# Patient Record
Sex: FEMALE | Race: BLACK OR AFRICAN AMERICAN | Employment: OTHER | URBAN - METROPOLITAN AREA
[De-identification: names, ages, dates, MRNs, and addresses within clinical notes are randomized per-mention and may not be internally consistent; named-entity substitution may affect disease eponyms.]

---

## 2018-03-01 PROBLEM — Z80.3 FAMILY HISTORY OF BREAST CANCER IN MOTHER: Status: ACTIVE | Noted: 2018-03-01

## 2018-03-01 PROBLEM — E66.01 MORBID OBESITY (HCC): Status: ACTIVE | Noted: 2018-03-01

## 2018-03-05 PROCEDURE — 82627 DEHYDROEPIANDROSTERONE: CPT | Performed by: INTERNAL MEDICINE

## 2018-03-05 PROCEDURE — 84402 ASSAY OF FREE TESTOSTERONE: CPT | Performed by: INTERNAL MEDICINE

## 2018-03-05 PROCEDURE — 87389 HIV-1 AG W/HIV-1&-2 AB AG IA: CPT | Performed by: INTERNAL MEDICINE

## 2018-03-05 PROCEDURE — 84403 ASSAY OF TOTAL TESTOSTERONE: CPT | Performed by: INTERNAL MEDICINE

## 2018-07-23 ENCOUNTER — HOSPITAL ENCOUNTER (EMERGENCY)
Facility: HOSPITAL | Age: 38
Discharge: HOME | End: 2018-07-23
Attending: EMERGENCY MEDICINE
Payer: COMMERCIAL

## 2018-07-23 VITALS
DIASTOLIC BLOOD PRESSURE: 70 MMHG | TEMPERATURE: 98 F | OXYGEN SATURATION: 98 % | RESPIRATION RATE: 18 BRPM | WEIGHT: 290 LBS | HEIGHT: 67 IN | SYSTOLIC BLOOD PRESSURE: 119 MMHG | HEART RATE: 90 BPM | BODY MASS INDEX: 45.52 KG/M2

## 2018-07-23 DIAGNOSIS — S39.012A BACK STRAIN, INITIAL ENCOUNTER: Primary | ICD-10-CM

## 2018-07-23 PROCEDURE — 99283 EMERGENCY DEPT VISIT LOW MDM: CPT

## 2018-07-23 RX ORDER — IBUPROFEN 600 MG/1
600 TABLET ORAL EVERY 8 HOURS PRN
Qty: 20 TABLET | Refills: 0 | Status: SHIPPED | OUTPATIENT
Start: 2018-07-23 | End: 2018-07-30

## 2018-07-23 RX ORDER — CYCLOBENZAPRINE HCL 10 MG
10 TABLET ORAL 3 TIMES DAILY PRN
Qty: 20 TABLET | Refills: 0 | Status: SHIPPED | OUTPATIENT
Start: 2018-07-23 | End: 2018-07-30

## 2018-07-23 NOTE — ED NOTES
PT safe to DC home per MD. Dominique Rodriguez to dress self. DC teaching done, pt verbalizes understanding. Ambulatory with steady gait to exit.

## 2018-07-23 NOTE — ED PROVIDER NOTES
Patient Seen in: [facility] Emergency Department    History   Patient presents with:  Back Pain (musculoskeletal)    Stated Complaint: Back pain x3 days    HPI    26-year-old female with history of chronic low back pain, here with complaints of ac days  Other systems are as noted in HPI. Constitutional and vital signs reviewed. All other systems reviewed and negative except as noted above.     Physical Exam   ED Triage Vitals [07/23/18 1008]  BP: 119/70  Pulse: 90  Resp: 18  Temp: 98.4 °F (36.9 hour(s)). Imaging Results Available and Reviewed by me while in ED:          EMERGENCY DEPARTMENT COURSE AND TREATMENT:  Patient's condition was stable during Emergency Department evaluation.      37yoF with back pain  - I personally reviewed and interpr Refills: 0    ibuprofen 600 MG Oral Tab  Take 1 tablet (600 mg total) by mouth every 8 (eight) hours as needed.   Qty: 20 tablet Refills: 0

## 2018-09-01 ENCOUNTER — HOSPITAL ENCOUNTER (EMERGENCY)
Facility: HOSPITAL | Age: 38
Discharge: HOME | End: 2018-09-01
Attending: EMERGENCY MEDICINE
Payer: COMMERCIAL

## 2018-09-01 VITALS
OXYGEN SATURATION: 99 % | WEIGHT: 270 LBS | RESPIRATION RATE: 18 BRPM | HEIGHT: 66 IN | HEART RATE: 101 BPM | SYSTOLIC BLOOD PRESSURE: 155 MMHG | BODY MASS INDEX: 43.39 KG/M2 | DIASTOLIC BLOOD PRESSURE: 86 MMHG | TEMPERATURE: 99 F

## 2018-09-01 DIAGNOSIS — S46.812A TRAPEZIUS MUSCLE STRAIN, LEFT, INITIAL ENCOUNTER: Primary | ICD-10-CM

## 2018-09-01 PROCEDURE — 99283 EMERGENCY DEPT VISIT LOW MDM: CPT

## 2018-09-01 RX ORDER — DIAZEPAM 5 MG/1
5 TABLET ORAL EVERY 6 HOURS PRN
Qty: 20 TABLET | Refills: 0 | Status: SHIPPED | OUTPATIENT
Start: 2018-09-01 | End: 2018-09-08

## 2018-09-01 RX ORDER — TRAMADOL HYDROCHLORIDE 50 MG/1
TABLET ORAL EVERY 6 HOURS PRN
Qty: 20 TABLET | Refills: 0 | Status: SHIPPED | OUTPATIENT
Start: 2018-09-01 | End: 2019-01-28 | Stop reason: ALTCHOICE

## 2018-09-01 NOTE — ED INITIAL ASSESSMENT (HPI)
Pt reports to ED with complaints of L neck and L shoulder pain. Pt denies any trauma. Pt had gastric bypass surgery last week at [facility].

## 2018-09-01 NOTE — ED PROVIDER NOTES
Patient Seen in: [facility] AND [facility] Emergency Department    History   Patient presents with:  Neck Pain    Stated Complaint: shoulder and neck pain    HPI    43 yo female with left sided neck pain for several days.  She c/o pain to the left side of the [place] distal pulses. Pulmonary/Chest: Effort normal and breath sounds normal.   Musculoskeletal: She exhibits no edema or deformity. Neurological: She is alert and oriented to person, place, and time. No cranial nerve deficit. Skin: Skin is warm and dry.

## 2018-12-23 ENCOUNTER — HOSPITAL ENCOUNTER (EMERGENCY)
Facility: HOSPITAL | Age: 38
Discharge: HOME | End: 2018-12-23
Attending: EMERGENCY MEDICINE
Payer: COMMERCIAL

## 2018-12-23 ENCOUNTER — APPOINTMENT (OUTPATIENT)
Dept: GENERAL RADIOLOGY | Facility: HOSPITAL | Age: 38
End: 2018-12-23
Attending: EMERGENCY MEDICINE
Payer: COMMERCIAL

## 2018-12-23 ENCOUNTER — APPOINTMENT (OUTPATIENT)
Dept: GENERAL RADIOLOGY | Facility: HOSPITAL | Age: 38
End: 2018-12-23
Payer: COMMERCIAL

## 2018-12-23 VITALS
DIASTOLIC BLOOD PRESSURE: 75 MMHG | OXYGEN SATURATION: 100 % | HEART RATE: 75 BPM | SYSTOLIC BLOOD PRESSURE: 139 MMHG | HEIGHT: 67 IN | BODY MASS INDEX: 36.1 KG/M2 | TEMPERATURE: 98 F | RESPIRATION RATE: 18 BRPM | WEIGHT: 230 LBS

## 2018-12-23 DIAGNOSIS — S92.501A CLOSED FRACTURE OF PHALANX OF RIGHT THIRD TOE, INITIAL ENCOUNTER: Primary | ICD-10-CM

## 2018-12-23 PROCEDURE — 73630 X-RAY EXAM OF FOOT: CPT | Performed by: EMERGENCY MEDICINE

## 2018-12-23 PROCEDURE — 99283 EMERGENCY DEPT VISIT LOW MDM: CPT

## 2018-12-23 NOTE — ED PROVIDER NOTES
Patient Seen in: [facility] AND [facility] Emergency Department    History   Patient presents with:  Lower Extremity Injury (musculoskeletal)    Stated Complaint: stub toe, may be broke    HPI    Patient is a 39-year-old female who presents with right third toe ([id number])    Result Date: 12/23/2018  CONCLUSION:  1. Nondisplaced fracture of the distal shaft of the third proximal phalanx.  2. Mild associated soft tissue swelling at the dorsum of the forefoot     Dictated by (CST): Kelly Wilson MD on 12/23/2018

## 2019-07-15 ENCOUNTER — HOSPITAL ENCOUNTER (EMERGENCY)
Facility: HOSPITAL | Age: 39
Discharge: HOME | End: 2019-07-15
Attending: EMERGENCY MEDICINE
Payer: COMMERCIAL

## 2019-07-15 VITALS
RESPIRATION RATE: 20 BRPM | OXYGEN SATURATION: 97 % | HEART RATE: 113 BPM | TEMPERATURE: 98 F | SYSTOLIC BLOOD PRESSURE: 138 MMHG | DIASTOLIC BLOOD PRESSURE: 67 MMHG

## 2019-07-15 DIAGNOSIS — M54.41 ACUTE BILATERAL LOW BACK PAIN WITH BILATERAL SCIATICA: Primary | ICD-10-CM

## 2019-07-15 DIAGNOSIS — M54.42 ACUTE BILATERAL LOW BACK PAIN WITH BILATERAL SCIATICA: Primary | ICD-10-CM

## 2019-07-15 PROCEDURE — 96372 THER/PROPH/DIAG INJ SC/IM: CPT

## 2019-07-15 PROCEDURE — 99283 EMERGENCY DEPT VISIT LOW MDM: CPT

## 2019-07-15 RX ORDER — DEXAMETHASONE SODIUM PHOSPHATE 10 MG/ML
10 INJECTION, SOLUTION INTRAMUSCULAR; INTRAVENOUS ONCE
Status: COMPLETED | OUTPATIENT
Start: 2019-07-15 | End: 2019-07-15

## 2019-07-15 RX ORDER — CYCLOBENZAPRINE HCL 10 MG
10 TABLET ORAL 3 TIMES DAILY PRN
Qty: 20 TABLET | Refills: 0 | Status: SHIPPED | OUTPATIENT
Start: 2019-07-15 | End: 2019-07-22

## 2019-07-15 RX ORDER — METHYLPREDNISOLONE 4 MG/1
TABLET ORAL
Qty: 1 PACKAGE | Refills: 0 | Status: SHIPPED | OUTPATIENT
Start: 2019-07-15 | End: 2019-07-20

## 2019-07-15 RX ORDER — HYDROCODONE BITARTRATE AND ACETAMINOPHEN 5; 325 MG/1; MG/1
1 TABLET ORAL EVERY 4 HOURS PRN
Qty: 10 TABLET | Refills: 0 | Status: SHIPPED | OUTPATIENT
Start: 2019-07-15 | End: 2019-08-16

## 2019-07-15 RX ORDER — HYDROCODONE BITARTRATE AND ACETAMINOPHEN 5; 325 MG/1; MG/1
1 TABLET ORAL ONCE
Status: COMPLETED | OUTPATIENT
Start: 2019-07-15 | End: 2019-07-15

## 2019-07-15 RX ORDER — CYCLOBENZAPRINE HCL 10 MG
10 TABLET ORAL ONCE
Status: COMPLETED | OUTPATIENT
Start: 2019-07-15 | End: 2019-07-15

## 2019-07-16 NOTE — ED PROVIDER NOTES
Patient Seen in: [facility] AND [facility] Emergency Department    History   Patient presents with:  Back Pain (musculoskeletal)    Stated Complaint: lower back pain x 1 week    HPI    21-year-old female presents for complaint of lower back pain.   Patient has a SpO2 97 %   O2 Device None (Room air)       Current:/67   Pulse 113   Temp 98.3 °F (36.8 °C) (Oral)   Resp 20   SpO2 97%         Physical Exam   Constitutional: She is oriented to person, place, and time.  She appears well-developed and well-nourish All questions were addressed and answered.                         Disposition and Plan     Clinical Impression:  Acute bilateral low back pain with bilateral sciatica  (primary encounter diagnosis)    Disposition:  Discharge  7/15/2019  9:33 pm    Follow-u

## 2019-07-16 NOTE — ED NOTES
Pt c/o low back pain spreading in to bl buttocks, states that it \"suddenly gave out on [her]\" x5days ago. Pt denies loss of bowel/bladder control, numbness/tingling to ble. CMS is intact to ble. Pt denies injury/trauma, and noted hx of back pain.  Will co

## 2019-07-16 NOTE — ED NOTES
Pt provided with discharge instructions and prescriptions. Verbalized understanding for plan of care at home and follow up. All questions/concerns addressed prior to discharge.    Pt ambulatory out of er with steady gait

## 2019-07-30 ENCOUNTER — OFFICE VISIT (OUTPATIENT)
Dept: OBGYN CLINIC | Facility: CLINIC | Age: 39
End: 2019-07-30
Payer: COMMERCIAL

## 2019-07-30 VITALS
BODY MASS INDEX: 32.33 KG/M2 | HEART RATE: 87 BPM | HEIGHT: 67 IN | WEIGHT: 206 LBS | SYSTOLIC BLOOD PRESSURE: 136 MMHG | DIASTOLIC BLOOD PRESSURE: 83 MMHG

## 2019-07-30 DIAGNOSIS — Z11.3 SCREEN FOR STD (SEXUALLY TRANSMITTED DISEASE): ICD-10-CM

## 2019-07-30 DIAGNOSIS — Z97.5 IUD (INTRAUTERINE DEVICE) IN PLACE: ICD-10-CM

## 2019-07-30 DIAGNOSIS — Z01.419 ENCOUNTER FOR GYNECOLOGICAL EXAMINATION WITHOUT ABNORMAL FINDING: Primary | ICD-10-CM

## 2019-07-30 DIAGNOSIS — Z12.4 SCREENING FOR MALIGNANT NEOPLASM OF CERVIX: ICD-10-CM

## 2019-07-30 PROCEDURE — 99385 PREV VISIT NEW AGE 18-39: CPT | Performed by: OBSTETRICS & GYNECOLOGY

## 2019-07-30 NOTE — PROGRESS NOTES
Tor Oswald is a 43year old female  No LMP recorded. (Menstrual status: IUD - Intrauterine Device). Patient presents with:  Gyn Exam: new patient, annual exam  She has no complaints.   She had mirena placed 5 years ago for contraception and she clubs or organizations: Not on file        Relationship status: Not on file      Intimate partner violence:        Fear of current or ex partner: Not on file        Emotionally abused: Not on file        Physically abused: Not on file        Forced sexual denies dysuria, incontinence, abnormal vaginal discharge, vaginal itching  Musculoskeletal:  denies back pain. Skin/Breast:  Denies any breast pain, lumps, or discharge. Neurological:  denies headaches, extremity weakness or numbness.   Psychiatric: fidelina No prescriptions requested or ordered in this encounter       None

## 2019-07-31 LAB
C TRACH DNA SPEC QL NAA+PROBE: NEGATIVE
HPV I/H RISK 1 DNA SPEC QL NAA+PROBE: POSITIVE
N GONORRHOEA DNA SPEC QL NAA+PROBE: NEGATIVE

## 2019-08-04 LAB
HPV16 DNA CVX QL PROBE+SIG AMP: NEGATIVE
HPV18 DNA CVX QL PROBE+SIG AMP: NEGATIVE

## 2019-08-04 NOTE — PROGRESS NOTES
Pap normal, HPV+ but negative for the strains most associated with cervical cancer, needs pap and HPV test in 1 year,call pt

## 2019-08-05 ENCOUNTER — TELEPHONE (OUTPATIENT)
Dept: OBGYN CLINIC | Facility: CLINIC | Age: 39
End: 2019-08-05

## 2019-08-05 NOTE — TELEPHONE ENCOUNTER
----- Message from Maricruz Muñoz MD sent at 8/4/2019  3:31 PM CDT -----  Pap normal, HPV+ but negative for the strains most associated with cervical cancer, needs pap and HPV test in 1 year,call pt

## 2019-08-16 NOTE — ED AVS SNAPSHOT
Delisa Ballard   MRN: [id number]    Department:  [facility] Emergency Department   Date of Visit:  2/4/2020           Disclosure     Insurance plans vary and the physician(s) referred by the ER may not be covered by your plan.  Please contact yo CARE PHYSICIAN AT ONCE OR RETURN IMMEDIATELY TO THE EMERGENCY DEPARTMENT. If you have been prescribed any medication(s), please fill your prescription right away and begin taking the medication(s) as directed.   If you believe that any of the medications Self

## 2019-09-17 ENCOUNTER — OFFICE VISIT (OUTPATIENT)
Dept: OBGYN CLINIC | Facility: CLINIC | Age: 39
End: 2019-09-17
Payer: COMMERCIAL

## 2019-09-17 VITALS
WEIGHT: 213.38 LBS | HEART RATE: 61 BPM | DIASTOLIC BLOOD PRESSURE: 80 MMHG | SYSTOLIC BLOOD PRESSURE: 128 MMHG | BODY MASS INDEX: 33 KG/M2

## 2019-09-17 DIAGNOSIS — Z30.432 ENCOUNTER FOR REMOVAL OF INTRAUTERINE CONTRACEPTIVE DEVICE: Primary | ICD-10-CM

## 2019-09-17 PROCEDURE — 58301 REMOVE INTRAUTERINE DEVICE: CPT | Performed by: OBSTETRICS & GYNECOLOGY

## 2019-10-21 ENCOUNTER — TELEPHONE (OUTPATIENT)
Dept: OBGYN CLINIC | Facility: CLINIC | Age: 39
End: 2019-10-21

## 2019-10-21 NOTE — TELEPHONE ENCOUNTER
Pt confirms +hpt and lmp is approximate on 9/10/19. Reports lmp was the first period after having IUD removed in July. Pt agrees to rotate PN appts with our 2 male and 4 female physicians. Pt accepted OBN on 11/4/19.  Advised pt to start PNV with iron, foli

## 2019-10-23 ENCOUNTER — HOSPITAL ENCOUNTER (EMERGENCY)
Facility: HOSPITAL | Age: 39
Discharge: HOME | End: 2019-10-23
Attending: EMERGENCY MEDICINE
Payer: COMMERCIAL

## 2019-10-23 ENCOUNTER — APPOINTMENT (OUTPATIENT)
Dept: ULTRASOUND IMAGING | Facility: HOSPITAL | Age: 39
End: 2019-10-23
Attending: EMERGENCY MEDICINE
Payer: COMMERCIAL

## 2019-10-23 VITALS
SYSTOLIC BLOOD PRESSURE: 118 MMHG | HEART RATE: 53 BPM | WEIGHT: 213 LBS | TEMPERATURE: 98 F | RESPIRATION RATE: 16 BRPM | DIASTOLIC BLOOD PRESSURE: 81 MMHG | OXYGEN SATURATION: 100 % | BODY MASS INDEX: 33 KG/M2

## 2019-10-23 DIAGNOSIS — O26.891 ABDOMINAL PAIN DURING PREGNANCY IN FIRST TRIMESTER: Primary | ICD-10-CM

## 2019-10-23 DIAGNOSIS — R10.9 ABDOMINAL PAIN DURING PREGNANCY IN FIRST TRIMESTER: Primary | ICD-10-CM

## 2019-10-23 PROCEDURE — 86900 BLOOD TYPING SEROLOGIC ABO: CPT | Performed by: EMERGENCY MEDICINE

## 2019-10-23 PROCEDURE — 87086 URINE CULTURE/COLONY COUNT: CPT

## 2019-10-23 PROCEDURE — 99285 EMERGENCY DEPT VISIT HI MDM: CPT

## 2019-10-23 PROCEDURE — 85025 COMPLETE CBC W/AUTO DIFF WBC: CPT | Performed by: EMERGENCY MEDICINE

## 2019-10-23 PROCEDURE — 86850 RBC ANTIBODY SCREEN: CPT | Performed by: EMERGENCY MEDICINE

## 2019-10-23 PROCEDURE — 81025 URINE PREGNANCY TEST: CPT

## 2019-10-23 PROCEDURE — 81001 URINALYSIS AUTO W/SCOPE: CPT

## 2019-10-23 PROCEDURE — 96374 THER/PROPH/DIAG INJ IV PUSH: CPT

## 2019-10-23 PROCEDURE — 84702 CHORIONIC GONADOTROPIN TEST: CPT | Performed by: EMERGENCY MEDICINE

## 2019-10-23 PROCEDURE — 76801 OB US < 14 WKS SINGLE FETUS: CPT | Performed by: EMERGENCY MEDICINE

## 2019-10-23 PROCEDURE — 87086 URINE CULTURE/COLONY COUNT: CPT | Performed by: EMERGENCY MEDICINE

## 2019-10-23 PROCEDURE — 80048 BASIC METABOLIC PNL TOTAL CA: CPT | Performed by: EMERGENCY MEDICINE

## 2019-10-23 PROCEDURE — 86901 BLOOD TYPING SEROLOGIC RH(D): CPT | Performed by: EMERGENCY MEDICINE

## 2019-10-23 PROCEDURE — 87186 SC STD MICRODIL/AGAR DIL: CPT | Performed by: EMERGENCY MEDICINE

## 2019-10-23 PROCEDURE — 81001 URINALYSIS AUTO W/SCOPE: CPT | Performed by: EMERGENCY MEDICINE

## 2019-10-23 PROCEDURE — 87077 CULTURE AEROBIC IDENTIFY: CPT | Performed by: EMERGENCY MEDICINE

## 2019-10-23 NOTE — ED INITIAL ASSESSMENT (HPI)
Patient took preg test yesterday which came back positive. Last menstrual 9/10. Patient has had cramping today since noon. Also states she was spotting earlier. Not at this time.

## 2019-10-24 ENCOUNTER — OFFICE VISIT (OUTPATIENT)
Dept: OBGYN CLINIC | Facility: CLINIC | Age: 39
End: 2019-10-24
Payer: COMMERCIAL

## 2019-10-24 VITALS
WEIGHT: 216 LBS | BODY MASS INDEX: 34 KG/M2 | HEART RATE: 88 BPM | DIASTOLIC BLOOD PRESSURE: 78 MMHG | SYSTOLIC BLOOD PRESSURE: 119 MMHG

## 2019-10-24 DIAGNOSIS — Z32.01 PREGNANCY EXAMINATION OR TEST, POSITIVE RESULT: ICD-10-CM

## 2019-10-24 DIAGNOSIS — N93.9 VAGINAL SPOTTING: ICD-10-CM

## 2019-10-24 DIAGNOSIS — O20.0 THREATENED ABORTION, ANTEPARTUM: ICD-10-CM

## 2019-10-24 DIAGNOSIS — R10.2 PELVIC CRAMPING: Primary | ICD-10-CM

## 2019-10-24 PROCEDURE — 99213 OFFICE O/P EST LOW 20 MIN: CPT | Performed by: OBSTETRICS & GYNECOLOGY

## 2019-10-24 RX ORDER — FLUOCINOLONE ACETONIDE 0.11 MG/ML
OIL TOPICAL
Refills: 1 | COMMUNITY
Start: 2019-09-18

## 2019-10-24 NOTE — PROGRESS NOTES
Karen Busby Angela    1980       Patient presents with: Follow - Up: er f/u cramps  pt was seen in [facility] ER yesterday with a positive pregnancy test and vaginal spotting and cramping.   bhcg at the time ws only 40 and no gestational sac seen on the US Oral Tab EC, Take 325 mg by mouth daily with breakfast., Disp: , Rfl:   Acetaminophen-Codeine #3 300-30 MG Oral Tab, Take 1 tablet by mouth every 6 (six) hours as needed for Pain., Disp: 15 tablet, Rfl: 0    No current facility-administered medications on

## 2019-10-24 NOTE — ED PROVIDER NOTES
Patient Seen in: [facility] Emergency Department      History   Patient presents with:  Pregnancy Issues (gynecologic)    Stated Complaint: pos preg test yesterday, cramping today, no bleeding    HPI    13-year-old female G3, P 2 at 4 weeks gestat systems reviewed and are negative. Positive for stated complaint: pos preg test yesterday, cramping today, no bleeding  Other systems are as noted in HPI. Constitutional and vital signs reviewed.       All other systems reviewed and negative except as oxygenation.     PROCEDURES:  none    DIAGNOSTICS:   Labs:  Recent Results (from the past 24 hour(s))   URINALYSIS WITH CULTURE REFLEX    Collection Time: 10/23/19  6:00 PM   Result Value Ref Range    Urine Color Yellow Yellow    Clarity Urine Clear Clear 3.80 - 5.30 x10(6)uL    HGB 12.3 12.0 - 16.0 g/dL    HCT 37.5 35.0 - 48.0 %    MCV 98.2 80.0 - 100.0 fL    MCH 32.2 26.0 - 34.0 pg    MCHC 32.8 31.0 - 37.0 g/dL    RDW-SD 44.8 35.1 - 46.3 fL    RDW 12.5 11.0 - 15.0 %    .0 150.0 - 450.0 10(3)uL    N on 10/23/2019 at 21:38              EMERGENCY DEPARTMENT COURSE AND TREATMENT:  Patient's condition was stable during Emergency Department evaluation.      38yoF with vaginal bleeding and abdominal pain in pregnancy test   - I personally reviewed and interp diagnosis)    Disposition:  Discharge  10/23/2019 11:12 pm    Follow-up:  Stuart Miramontes MD  [address] [place] [address]. [facility]  [phone]    Schedule an appointment as soon as possible for a visit in 2 days  for repeat HCG/possible Ult

## 2019-10-26 ENCOUNTER — APPOINTMENT (OUTPATIENT)
Dept: LAB | Facility: HOSPITAL | Age: 39
End: 2019-10-26
Attending: OBSTETRICS & GYNECOLOGY
Payer: COMMERCIAL

## 2019-10-26 ENCOUNTER — TELEPHONE (OUTPATIENT)
Dept: OBGYN CLINIC | Facility: CLINIC | Age: 39
End: 2019-10-26

## 2019-10-26 DIAGNOSIS — O20.0 THREATENED ABORTION, ANTEPARTUM: ICD-10-CM

## 2019-10-26 DIAGNOSIS — Z34.81 ENCOUNTER FOR SUPERVISION OF OTHER NORMAL PREGNANCY IN FIRST TRIMESTER: Primary | ICD-10-CM

## 2019-10-26 PROCEDURE — 36415 COLL VENOUS BLD VENIPUNCTURE: CPT

## 2019-10-26 PROCEDURE — 84702 CHORIONIC GONADOTROPIN TEST: CPT

## 2019-10-26 NOTE — TELEPHONE ENCOUNTER
CAP reviewed her quant  from 10-26-19 and level was 109. Informed pt that wants her to do an ob ultrasound in one week. Informed pt to schedule an OBN.  Pt stated understanding.      (10-23-19 hcg was 40 and 10-26-19 hcg as 109)

## 2019-11-01 ENCOUNTER — TELEPHONE (OUTPATIENT)
Dept: OBGYN CLINIC | Facility: CLINIC | Age: 39
End: 2019-11-01

## 2019-11-01 DIAGNOSIS — O20.0 THREATENED ABORTION, ANTEPARTUM: Primary | ICD-10-CM

## 2019-11-01 NOTE — TELEPHONE ENCOUNTER
Paged on call with c/o spotty bleeding when wiping earlier today but now like a light menstrual flow. No pain. She is Rh negative and had Rhogam. I reviewed previous HCG levels and also ED US result.  I asked Pattilesly Landrymarilynn to come for repeat HCG in early AM tomorro

## 2019-11-02 ENCOUNTER — APPOINTMENT (OUTPATIENT)
Dept: LAB | Facility: HOSPITAL | Age: 39
End: 2019-11-02
Attending: OBSTETRICS & GYNECOLOGY
Payer: MEDICAID

## 2019-11-02 ENCOUNTER — TELEPHONE (OUTPATIENT)
Dept: OBGYN CLINIC | Facility: CLINIC | Age: 39
End: 2019-11-02

## 2019-11-02 DIAGNOSIS — O20.0 THREATENED ABORTION, ANTEPARTUM: Primary | ICD-10-CM

## 2019-11-02 DIAGNOSIS — O20.0 THREATENED ABORTION, ANTEPARTUM: ICD-10-CM

## 2019-11-02 PROCEDURE — 36415 COLL VENOUS BLD VENIPUNCTURE: CPT

## 2019-11-02 PROCEDURE — 84702 CHORIONIC GONADOTROPIN TEST: CPT

## 2019-11-02 NOTE — TELEPHONE ENCOUNTER
I spoke with patient to discuss this AM's HCG level which is 407. This does not show significant rise from 10-26 but we just don't know how high it was prior to the significant bleed yesterday. It should have risen to 900-1000.  She has no pain so I suggest

## 2019-11-04 ENCOUNTER — TELEPHONE (OUTPATIENT)
Dept: OBGYN CLINIC | Facility: CLINIC | Age: 39
End: 2019-11-04

## 2019-11-04 ENCOUNTER — APPOINTMENT (OUTPATIENT)
Dept: LAB | Facility: HOSPITAL | Age: 39
End: 2019-11-04
Attending: OBSTETRICS & GYNECOLOGY
Payer: MEDICAID

## 2019-11-04 DIAGNOSIS — O20.0 THREATENED ABORTION, ANTEPARTUM: ICD-10-CM

## 2019-11-04 PROCEDURE — 36415 COLL VENOUS BLD VENIPUNCTURE: CPT

## 2019-11-04 PROCEDURE — 84702 CHORIONIC GONADOTROPIN TEST: CPT

## 2019-11-04 NOTE — TELEPHONE ENCOUNTER
Pt states she had an emergency with her youngest daughter and will try and go by 8pm tonight to have labs done. Pt reports spotting bright red \"only with wiping\". Pt denies pain or cramping. Advised pt to go to lab asap to have quant done. Lab hours provided to pt. Urged pt to go earlier then 8pm. Pt verbalized understanding.

## 2019-11-04 NOTE — TELEPHONE ENCOUNTER
Pt was signed out to me over the weekend by BARRINGTON.  Was supposed to go for quant HCG today. Please call Dr. Tran Quiroga with results. Please call patient and get update on status. Thanks!

## 2019-11-04 NOTE — TELEPHONE ENCOUNTER
See communication from 11-22-19, BARRINGTON's notes. Look for hcg and discuss next step with MD on Call.

## 2019-11-05 ENCOUNTER — OFFICE VISIT (OUTPATIENT)
Dept: HEMATOLOGY/ONCOLOGY | Facility: HOSPITAL | Age: 39
End: 2019-11-05
Attending: OBSTETRICS & GYNECOLOGY
Payer: MEDICAID

## 2019-11-05 ENCOUNTER — LAB ENCOUNTER (OUTPATIENT)
Dept: LAB | Facility: HOSPITAL | Age: 39
End: 2019-11-05
Attending: OBSTETRICS & GYNECOLOGY
Payer: MEDICAID

## 2019-11-05 VITALS
RESPIRATION RATE: 16 BRPM | BODY MASS INDEX: 33.43 KG/M2 | HEART RATE: 68 BPM | WEIGHT: 213 LBS | SYSTOLIC BLOOD PRESSURE: 125 MMHG | DIASTOLIC BLOOD PRESSURE: 62 MMHG | HEIGHT: 67 IN | TEMPERATURE: 98 F

## 2019-11-05 DIAGNOSIS — O00.80 OTHER ECTOPIC PREGNANCY WITHOUT INTRAUTERINE PREGNANCY: Primary | ICD-10-CM

## 2019-11-05 DIAGNOSIS — O00.90 ECTOPIC PREGNANCY: ICD-10-CM

## 2019-11-05 DIAGNOSIS — O00.80 OTHER ECTOPIC PREGNANCY WITHOUT INTRAUTERINE PREGNANCY: ICD-10-CM

## 2019-11-05 DIAGNOSIS — O00.90 ECTOPIC PREGNANCY: Primary | ICD-10-CM

## 2019-11-05 PROCEDURE — 80053 COMPREHEN METABOLIC PANEL: CPT

## 2019-11-05 PROCEDURE — 96401 CHEMO ANTI-NEOPL SQ/IM: CPT

## 2019-11-05 PROCEDURE — 36415 COLL VENOUS BLD VENIPUNCTURE: CPT

## 2019-11-05 PROCEDURE — 85025 COMPLETE CBC W/AUTO DIFF WBC: CPT

## 2019-11-05 PROCEDURE — 84702 CHORIONIC GONADOTROPIN TEST: CPT

## 2019-11-05 NOTE — TELEPHONE ENCOUNTER
[facility] CONFIRMED THEY CAN SEE THE MTX ORDER AND THAT IT WAS DONE CORRECTLY. THEY WILL CALL THE PT TO GET HER IN ALONG WITH GETTING NECESSARY LABS DONE ALSO. I LEFT MESSAGE FOR PT THAT [facility] WILL BE CALLING HER BUT TO NOTIFY US IF SHE DOES NOT HEAR FROM THEM BY 4PM TODAY.

## 2019-11-05 NOTE — TELEPHONE ENCOUNTER
PT IS CALLING SAID SHE SPOKE TO DR DUARTE LAST NIGHT AND WAS TOLD A NURSE WOULD BE CALLING HER INT HE MORNING ,

## 2019-11-05 NOTE — TELEPHONE ENCOUNTER
Spoke with patient regarding her abnormally rising bhcg's and discussed that this  pregnancy is not normal and is either an impending SAB vs early ectopic. Due to the risks of possible ectopic I recommended methotrexate and discussed the risks and benefits of this treatment with pt. She denies abdominal pain or vaginal bleeding. She is willing to come in am for methotrexate at the infusion center. Please help her arrange this for am. She does not have to work tomorrow. Dose will be 50mg per meters squared- please have pharmacy calculate dose based on her current weight.  Thank you

## 2019-11-05 NOTE — TELEPHONE ENCOUNTER
11-4-19 QUANT = 542.0, 11-2-19 QUANT = 407.0 AND 10-26-19 QUANT = 109.0. PLEASE SEE 11-2-19 PHONE ENCOUNTER FROM BARRINGTON.   MESSAGE TO CAP (ON CALL). PT IS AWARE OF THE QUANT FROM TODAY. DENIES ANY PELVIC PAIN. WE DISCUSSED MTX AND ALSO THE NEED TO GO STRAIGHT TO THE ER IF SHE DEVELOPS ANY PELVIC PAIN. PT AWARE WE WILL CALL THIS EVENING OR FIRST THING IN THE MORNING WITH RECS FROM CAP.

## 2019-11-05 NOTE — PROGRESS NOTES
Pt arrived for methotrexate injection for Ectopic Pregnancy/Spontaneous . Pt received education from ordering provider regarding diagnosis, drug, labs, and follow up care.   Pt received Rhogam on 10/23/19 in MD office, confirmed with KIRAN Kaufman

## 2019-11-05 NOTE — PATIENT INSTRUCTIONS
- Go for blood work on Saturday 11/9/19 and Tuesday 11/12/19     - Drink plenty of fluids for the next few days  - Follow up with your obstetrician as ordered    Call your doctor immediately if you have any of the following:  - Fever greater than 100.4 deg

## 2019-11-08 ENCOUNTER — APPOINTMENT (OUTPATIENT)
Dept: LAB | Facility: HOSPITAL | Age: 39
End: 2019-11-08
Attending: OBSTETRICS & GYNECOLOGY
Payer: MEDICAID

## 2019-11-08 DIAGNOSIS — O00.80 OTHER ECTOPIC PREGNANCY WITHOUT INTRAUTERINE PREGNANCY: ICD-10-CM

## 2019-11-08 PROCEDURE — 84702 CHORIONIC GONADOTROPIN TEST: CPT

## 2019-11-08 PROCEDURE — 36415 COLL VENOUS BLD VENIPUNCTURE: CPT

## 2019-11-09 ENCOUNTER — TELEPHONE (OUTPATIENT)
Dept: PEDIATRICS CLINIC | Facility: CLINIC | Age: 39
End: 2019-11-09

## 2019-11-09 NOTE — TELEPHONE ENCOUNTER
PT NOTIFIED 11-8-19 QUANT = 669.0.  REASSURED HER THE BLOOD DRAW ON MON (DAY 7) WILL LET US KNOW IF SHE NEEDS ANOTHER SHOT OF MTX.   WILL FORWARD TO BU[place]Y (FYI)

## 2019-11-12 NOTE — TELEPHONE ENCOUNTER
Notified pt she is due for Raquel Trinidad today and lab is open until 8 pm. Pt states she cannot make it today. Advised on the importance of completing lab today. States she will have the lab done tomorrow morning. Informed the lab opens at 6:30 am. Nurse please monitor for result tomorrow morning. To BRITTANY on call as fyi.

## 2019-11-12 NOTE — TELEPHONE ENCOUNTER
Since the patient received MTX she needs to follow protocol regarding timing.   If she was advised to come today, she needs to come today

## 2019-11-12 NOTE — TELEPHONE ENCOUNTER
Notified pt of message below. Again advised on importance of monitoring Quant in case pt needs another dose of medication to prevent a potential medical emergency. Pt states she cannot come in today. States she will be the first person at 6:30 am tomorrow morning. Nurse please monitor for result.

## 2019-11-12 NOTE — TELEPHONE ENCOUNTER
Pt states that she went to get the Hcg done today and pt stating that registration would not draw it, because she does not have insurance at the present and would not pay. Pt states that she was laid off and does not have insurance at the present. She is applying for [employer], but does not have it yet. Informed pt that I discussed with our manager and pt must have the hcg done today. Informed pt to pay what she can for the lab test. Informed pt to have registration if they decline drawning the blood. Pt stated understanding and went to have the hcg drawn today.

## 2019-11-13 ENCOUNTER — TELEPHONE (OUTPATIENT)
Dept: OBGYN CLINIC | Facility: CLINIC | Age: 39
End: 2019-11-13

## 2019-11-13 ENCOUNTER — LABORATORY ENCOUNTER (OUTPATIENT)
Dept: LAB | Facility: HOSPITAL | Age: 39
End: 2019-11-13
Attending: OBSTETRICS & GYNECOLOGY
Payer: MEDICAID

## 2019-11-13 DIAGNOSIS — O00.90 ECTOPIC PREGNANCY WITHOUT INTRAUTERINE PREGNANCY, UNSPECIFIED LOCATION: ICD-10-CM

## 2019-11-13 DIAGNOSIS — O00.90 ECTOPIC PREGNANCY WITHOUT INTRAUTERINE PREGNANCY, UNSPECIFIED LOCATION: Primary | ICD-10-CM

## 2019-11-13 LAB — B-HCG SERPL-ACNC: 473 MIU/ML

## 2019-11-13 PROCEDURE — 85025 COMPLETE CBC W/AUTO DIFF WBC: CPT

## 2019-11-13 PROCEDURE — 80053 COMPREHEN METABOLIC PANEL: CPT

## 2019-11-13 PROCEDURE — 84702 CHORIONIC GONADOTROPIN TEST: CPT | Performed by: OBSTETRICS & GYNECOLOGY

## 2019-11-13 PROCEDURE — 36415 COLL VENOUS BLD VENIPUNCTURE: CPT

## 2019-11-13 NOTE — TELEPHONE ENCOUNTER
IGOR  [address], REGISTRATION SUPERVISOR, BRIEFLY EXPLAINED SITUATION AND ASKED FOR SOME DIRECTION TODAY TO GET THIS PT IN FOR BLOOD DRAW.

## 2019-11-13 NOTE — TELEPHONE ENCOUNTER
STATES SHE OFFERED TO PAY $5 YESTERDAY BUT REGISTRATION WOULD NOT ACCEPT HER PAYMENT, WAS TOLD SHE HAD TO PAY MORE. PT CONFIRMED WITH [employer] THAT EVERYTHING WILL BE COVERED FROM THE TIME HER REGULAR INSURANCE  BUT THE PAPERWORK HAS TO BE PROCESSED, SHOULD BE ALL DONE BY 11-15 OR . PT STATES SHE IS NOT HAVING PAIN AND NO BLEEDING/SPOTTING SINCE FRI.

## 2019-11-13 NOTE — TELEPHONE ENCOUNTER
SPOKE WITH MYRTLE AND REVIEWED WHAT HAPPENED WITH PT TRYING TO GET LABS DRAWN. SHE WILL BE SURE THIS IS ADDRESSED AND ASSURED ME THE PT CAN COME FOR LABS NOW/THIS EVENING AND WILL NOT BE REQUIRED TO PAY OUT OF POCKET. PT NOTIFIED AND WILL COME FOR LABS LIKELY AROUND 5PM AFTER DROPPING HER DAUGHTER OFF AT WORK.

## 2019-11-13 NOTE — TELEPHONE ENCOUNTER
SEE 11-5-19 RESULT NOTE FOR QUANT. [place] REQUESTED ALL LABS BE ORDERED UNDER CAP'S NAME. CBC, CMP AND QUANT FOR DAY 7 DRAW CANCELLED AND REORDERED IN CAP'S NAME.

## 2019-11-13 NOTE — TELEPHONE ENCOUNTER
Pt waiting on [employer] to kick in, per Dr Farrar pt went to Labs to do bloodwork and they would not see her.  Pt said discussed matter with her and she said tell Labs to call upstairs and they would not

## 2019-11-14 NOTE — TELEPHONE ENCOUNTER
Please see below why lab was not completed on time. Message below reviewed with CAP. Per CAP pt needs to have quant drawn 48 hrs from last draw. Methotrexate was given on 11/5. Quant 11/13= 473  Quant 11/8= 669   Quant 11/5= 595    Informed pt of needing to have quant drawn again from 48 hours from last draw. Pt denies bleeding, spotting, or pelvic pain. Strict ectopic precautions given to pt. Quant ordered. Pt verbalized understanding.

## 2019-11-15 ENCOUNTER — APPOINTMENT (OUTPATIENT)
Dept: LAB | Facility: HOSPITAL | Age: 39
End: 2019-11-15
Attending: OBSTETRICS & GYNECOLOGY
Payer: MEDICAID

## 2019-11-15 DIAGNOSIS — O00.90 ECTOPIC PREGNANCY WITHOUT INTRAUTERINE PREGNANCY, UNSPECIFIED LOCATION: ICD-10-CM

## 2019-11-15 PROCEDURE — 84702 CHORIONIC GONADOTROPIN TEST: CPT

## 2019-11-15 PROCEDURE — 36415 COLL VENOUS BLD VENIPUNCTURE: CPT

## 2019-11-16 NOTE — TELEPHONE ENCOUNTER
11/15 quant= 358. [address] [facility] [address] [facility] [address] on call reviewed Quants. V/O received for pt to repeat Quant in 2-3 days to ensure it continues to drop. Pt notified and agrees to repeat Quant on Monday 11/18.

## 2019-11-18 ENCOUNTER — TELEPHONE (OUTPATIENT)
Dept: OBGYN CLINIC | Facility: CLINIC | Age: 39
End: 2019-11-18

## 2019-11-18 DIAGNOSIS — O00.91 ECTOPIC PREGNANCY WITH INTRAUTERINE PREGNANCY, UNSPECIFIED LOCATION: Primary | ICD-10-CM

## 2019-11-18 NOTE — TELEPHONE ENCOUNTER
Please inform pt that her serum quants are decreasing.   She needs to repeat the quant weekly until zero- please order and inform pt, thanks

## 2019-11-25 ENCOUNTER — TELEPHONE (OUTPATIENT)
Dept: OBGYN CLINIC | Facility: CLINIC | Age: 39
End: 2019-11-25

## 2019-11-26 ENCOUNTER — TELEPHONE (OUTPATIENT)
Dept: OBGYN UNIT | Facility: HOSPITAL | Age: 39
End: 2019-11-26

## 2019-11-27 NOTE — TELEPHONE ENCOUNTER
Informed pt the need to return weekly for quant level. Encouraged pt to go asap as last quant was on 11/15/19. Pt states she will go later today for quant. Pt verbalized understanding.

## 2019-12-05 ENCOUNTER — TELEPHONE (OUTPATIENT)
Dept: OBGYN CLINIC | Facility: CLINIC | Age: 39
End: 2019-12-05

## 2019-12-05 NOTE — TELEPHONE ENCOUNTER
Pt has still not come in for her quant, please advise her that it is very important that we follow these levels until they are zero to make sure that she does not develop gestational trophoblastic disease which is when the products of conception can become invasive and try to invade the uterine muscle and spread to other organs. This complication is rare but because it can be very serious, this is the reason we follow the hormone level until it is negative. Please ask her to come in asap.

## 2020-02-04 ENCOUNTER — APPOINTMENT (OUTPATIENT)
Dept: ULTRASOUND IMAGING | Facility: HOSPITAL | Age: 40
End: 2020-02-04
Attending: PHYSICIAN ASSISTANT
Payer: MEDICAID

## 2020-02-04 ENCOUNTER — HOSPITAL ENCOUNTER (EMERGENCY)
Facility: HOSPITAL | Age: 40
Discharge: HOME | End: 2020-02-04
Attending: PHYSICIAN ASSISTANT
Payer: MEDICAID

## 2020-02-04 VITALS
HEART RATE: 69 BPM | SYSTOLIC BLOOD PRESSURE: 120 MMHG | RESPIRATION RATE: 20 BRPM | OXYGEN SATURATION: 100 % | DIASTOLIC BLOOD PRESSURE: 73 MMHG

## 2020-02-04 DIAGNOSIS — O20.0 THREATENED MISCARRIAGE: Primary | ICD-10-CM

## 2020-02-04 DIAGNOSIS — O23.41 URINARY TRACT INFECTION IN MOTHER DURING FIRST TRIMESTER OF PREGNANCY: ICD-10-CM

## 2020-02-04 LAB
ANION GAP SERPL CALC-SCNC: 4 MMOL/L (ref 0–18)
ANTIBODY SCREEN: NEGATIVE
B-HCG SERPL-ACNC: 37 MIU/ML
B-HCG UR QL: POSITIVE
B-HCG UR QL: POSITIVE
BASOPHILS # BLD AUTO: 0.03 X10(3) UL (ref 0–0.2)
BASOPHILS NFR BLD AUTO: 0.3 %
BILIRUB UR QL: NEGATIVE
BUN BLD-MCNC: 9 MG/DL (ref 7–18)
BUN/CREAT SERPL: 12.5 (ref 10–20)
CALCIUM BLD-MCNC: 8.4 MG/DL (ref 8.5–10.1)
CHLORIDE SERPL-SCNC: 111 MMOL/L (ref 98–112)
CLARITY UR: CLEAR
CO2 SERPL-SCNC: 24 MMOL/L (ref 21–32)
COLOR UR: YELLOW
CREAT BLD-MCNC: 0.72 MG/DL (ref 0.55–1.02)
DEPRECATED RDW RBC AUTO: 43 FL (ref 35.1–46.3)
EOSINOPHIL # BLD AUTO: 0.1 X10(3) UL (ref 0–0.7)
EOSINOPHIL NFR BLD AUTO: 1 %
ERYTHROCYTE [DISTWIDTH] IN BLOOD BY AUTOMATED COUNT: 12.1 % (ref 11–15)
GLUCOSE BLD-MCNC: 71 MG/DL (ref 70–99)
GLUCOSE UR-MCNC: NEGATIVE MG/DL
HCT VFR BLD AUTO: 38.6 % (ref 35–48)
HGB BLD-MCNC: 12.4 G/DL (ref 12–16)
HGB UR QL STRIP.AUTO: NEGATIVE
IMM GRANULOCYTES # BLD AUTO: 0.03 X10(3) UL (ref 0–1)
IMM GRANULOCYTES NFR BLD: 0.3 %
KETONES UR-MCNC: NEGATIVE MG/DL
LYMPHOCYTES # BLD AUTO: 2.94 X10(3) UL (ref 1–4)
LYMPHOCYTES NFR BLD AUTO: 29.8 %
MCH RBC QN AUTO: 31.2 PG (ref 26–34)
MCHC RBC AUTO-ENTMCNC: 32.1 G/DL (ref 31–37)
MCV RBC AUTO: 97 FL (ref 80–100)
MONOCYTES # BLD AUTO: 0.59 X10(3) UL (ref 0.1–1)
MONOCYTES NFR BLD AUTO: 6 %
NEUTROPHILS # BLD AUTO: 6.17 X10 (3) UL (ref 1.5–7.7)
NEUTROPHILS # BLD AUTO: 6.17 X10(3) UL (ref 1.5–7.7)
NEUTROPHILS NFR BLD AUTO: 62.6 %
NITRITE UR QL STRIP.AUTO: NEGATIVE
OSMOLALITY SERPL CALC.SUM OF ELEC: 285 MOSM/KG (ref 275–295)
PH UR: 5 [PH] (ref 5–8)
PLATELET # BLD AUTO: 245 10(3)UL (ref 150–450)
POTASSIUM SERPL-SCNC: 3.5 MMOL/L (ref 3.5–5.1)
PROT UR-MCNC: NEGATIVE MG/DL
RBC # BLD AUTO: 3.98 X10(6)UL (ref 3.8–5.3)
RBC #/AREA URNS AUTO: 3 /HPF
RH BLOOD TYPE: NEGATIVE
SODIUM SERPL-SCNC: 139 MMOL/L (ref 136–145)
SP GR UR STRIP: 1.01 (ref 1–1.03)
UROBILINOGEN UR STRIP-ACNC: <2
WBC # BLD AUTO: 9.9 X10(3) UL (ref 4–11)
WBC #/AREA URNS AUTO: 6 /HPF

## 2020-02-04 PROCEDURE — 84702 CHORIONIC GONADOTROPIN TEST: CPT | Performed by: PHYSICIAN ASSISTANT

## 2020-02-04 PROCEDURE — 96361 HYDRATE IV INFUSION ADD-ON: CPT

## 2020-02-04 PROCEDURE — 86901 BLOOD TYPING SEROLOGIC RH(D): CPT | Performed by: PHYSICIAN ASSISTANT

## 2020-02-04 PROCEDURE — 85025 COMPLETE CBC W/AUTO DIFF WBC: CPT | Performed by: PHYSICIAN ASSISTANT

## 2020-02-04 PROCEDURE — 76817 TRANSVAGINAL US OBSTETRIC: CPT | Performed by: PHYSICIAN ASSISTANT

## 2020-02-04 PROCEDURE — 81025 URINE PREGNANCY TEST: CPT

## 2020-02-04 PROCEDURE — 87077 CULTURE AEROBIC IDENTIFY: CPT | Performed by: PHYSICIAN ASSISTANT

## 2020-02-04 PROCEDURE — 87086 URINE CULTURE/COLONY COUNT: CPT | Performed by: PHYSICIAN ASSISTANT

## 2020-02-04 PROCEDURE — 80048 BASIC METABOLIC PNL TOTAL CA: CPT | Performed by: PHYSICIAN ASSISTANT

## 2020-02-04 PROCEDURE — 81001 URINALYSIS AUTO W/SCOPE: CPT | Performed by: PHYSICIAN ASSISTANT

## 2020-02-04 PROCEDURE — 96374 THER/PROPH/DIAG INJ IV PUSH: CPT

## 2020-02-04 PROCEDURE — 99284 EMERGENCY DEPT VISIT MOD MDM: CPT

## 2020-02-04 PROCEDURE — 86850 RBC ANTIBODY SCREEN: CPT | Performed by: PHYSICIAN ASSISTANT

## 2020-02-04 PROCEDURE — 76801 OB US < 14 WKS SINGLE FETUS: CPT | Performed by: PHYSICIAN ASSISTANT

## 2020-02-04 PROCEDURE — 86900 BLOOD TYPING SEROLOGIC ABO: CPT | Performed by: PHYSICIAN ASSISTANT

## 2020-02-04 PROCEDURE — 87186 SC STD MICRODIL/AGAR DIL: CPT | Performed by: PHYSICIAN ASSISTANT

## 2020-02-04 RX ORDER — CEPHALEXIN 500 MG/1
500 CAPSULE ORAL 3 TIMES DAILY
Qty: 21 CAPSULE | Refills: 0 | Status: SHIPPED | OUTPATIENT
Start: 2020-02-04 | End: 2020-02-11

## 2020-02-04 NOTE — ED NOTES
Pt states she took a pregnancy test last week and it was positive- states last night began to have mild bright red spotting. Pt denies pain. Pt states this past Oct had an ectopic pregnancy and needed injections to remove.

## 2020-02-04 NOTE — ED PROVIDER NOTES
Patient Seen in: [facility] AND [facility] Emergency Department    History   Patient presents with:  Pregnancy Issues    Stated Complaint: positive preg test, bleeding    HPI    Patient is an unknown weeks pregnant 79-year-old female that presents with a chief c Oral Tab,  Take 500 mcg by mouth daily. Cholecalciferol (VITAMIN D) 1000 units Oral Tab,  Take by mouth.    ferrous sulfate 325 (65 FE) MG Oral Tab EC,  Take 325 mg by mouth daily with breakfast.       Family History   Problem Relation Age of Onset   • Hy guarding. No organomegaly is noted. No peritoneal signs. Normal bowel sounds. No McBurney point tenderness. Negative Nunez sign. Genitourinary: Not examined. Lymphatic: No gross lymphadenopathy noted.   Musculoskeletal: Musculoskeletal system is will tests on the individual orders. TYPE AND SCREEN    Narrative: The following orders were created for panel order TYPE AND SCREEN.   Procedure                               Abnormality         Status                     --------- MD  [address]  [phone]    Schedule an appointment as soon as possible for a visit in 2 days  For follow-up    We recommend that you schedule follow up care with a primary care provider within the next three concepcion

## 2020-02-04 NOTE — ED INITIAL ASSESSMENT (HPI)
Patient took a home pregnancy test last week that came back positive. States she began spotting yesterday without pain.

## 2020-02-10 ENCOUNTER — APPOINTMENT (OUTPATIENT)
Dept: LAB | Facility: HOSPITAL | Age: 40
End: 2020-02-10
Attending: OBSTETRICS & GYNECOLOGY
Payer: COMMERCIAL

## 2020-02-10 ENCOUNTER — TELEPHONE (OUTPATIENT)
Dept: OBGYN CLINIC | Facility: CLINIC | Age: 40
End: 2020-02-10

## 2020-02-10 ENCOUNTER — OFFICE VISIT (OUTPATIENT)
Dept: OBGYN CLINIC | Facility: CLINIC | Age: 40
End: 2020-02-10
Payer: COMMERCIAL

## 2020-02-10 VITALS
HEART RATE: 91 BPM | DIASTOLIC BLOOD PRESSURE: 73 MMHG | WEIGHT: 224 LBS | BODY MASS INDEX: 35 KG/M2 | SYSTOLIC BLOOD PRESSURE: 117 MMHG

## 2020-02-10 DIAGNOSIS — O26.899 CRAMPING AFFECTING PREGNANCY, ANTEPARTUM: ICD-10-CM

## 2020-02-10 DIAGNOSIS — R10.9 CRAMPING AFFECTING PREGNANCY, ANTEPARTUM: ICD-10-CM

## 2020-02-10 DIAGNOSIS — N93.9 VAGINA BLEEDING: ICD-10-CM

## 2020-02-10 DIAGNOSIS — Z87.59 HISTORY OF ECTOPIC PREGNANCY: Primary | ICD-10-CM

## 2020-02-10 DIAGNOSIS — Z87.59 HISTORY OF ECTOPIC PREGNANCY: ICD-10-CM

## 2020-02-10 DIAGNOSIS — N93.9 VAGINAL BLEEDING: Primary | ICD-10-CM

## 2020-02-10 LAB — B-HCG SERPL-ACNC: 112 MIU/ML

## 2020-02-10 PROCEDURE — 99214 OFFICE O/P EST MOD 30 MIN: CPT | Performed by: OBSTETRICS & GYNECOLOGY

## 2020-02-10 PROCEDURE — 36415 COLL VENOUS BLD VENIPUNCTURE: CPT

## 2020-02-10 PROCEDURE — 84702 CHORIONIC GONADOTROPIN TEST: CPT

## 2020-02-10 NOTE — TELEPHONE ENCOUNTER
Pt informed of MAZs recs and verbalized understanding. Pt instructed to repeat quant 48-72 hours from [address] today. Order placed.

## 2020-02-10 NOTE — H&P
HPI:  The patient is a 50-year-old -0-1-2 at 3 weeks and 5 days by suspected LMP of January 15 who presents for ER follow-up. Patient since she went to the ER on  with cramping and vaginal bleeding.   She has a history of a ectopic pregnancy HISTORY:  Past Medical History:   Diagnosis Date   • Acne    • Chronic low back pain    • Ectopic pregnancy    • Family history of breast cancer in mother 3/1/2018   • Gastric bypass status for obesity    • History of Anthony-en-Y gastric bypass     8/18  Concerns:        Not on file    Social History Narrative       for pharmaceutical company (works 6 pm-6 am).  6/19 and lives with /children. 2 children (13 and 9 yo). Lives in own home. 2 older sisters.       Non palpitations  Respiratory:  denies shortness of breath  Gastrointestinal:  denies heartburn, abdominal pain, diarrhea or constipation  Genitourinary:  denies dysuria, incontinence, abnormal vaginal discharge, vaginal itching  Musculoskeletal:  denies back would also get ultrasound in 6 to 7 weeks to ensure viability and intrauterine pregnancy. I did explain to the patient that I will need to discuss with my partners if she is a candidate for prenatal care in our office that she is been noncompliant.   She v

## 2020-02-11 ENCOUNTER — APPOINTMENT (OUTPATIENT)
Dept: LAB | Facility: HOSPITAL | Age: 40
End: 2020-02-11
Attending: OBSTETRICS & GYNECOLOGY
Payer: COMMERCIAL

## 2020-02-11 DIAGNOSIS — N93.9 VAGINAL BLEEDING: ICD-10-CM

## 2020-02-11 LAB — B-HCG SERPL-ACNC: 77 MIU/ML

## 2020-02-11 PROCEDURE — 36415 COLL VENOUS BLD VENIPUNCTURE: CPT

## 2020-02-11 PROCEDURE — 84702 CHORIONIC GONADOTROPIN TEST: CPT

## 2020-02-12 ENCOUNTER — TELEPHONE (OUTPATIENT)
Dept: OBGYN CLINIC | Facility: CLINIC | Age: 40
End: 2020-02-12

## 2020-02-12 DIAGNOSIS — O20.0 THREATENED ABORTION, ANTEPARTUM: Primary | ICD-10-CM

## 2020-02-12 NOTE — TELEPHONE ENCOUNTER
Informed pt that [address] stated that hcg down trending, but it was suppose to be done 48-72 hours after last draw. Informed pt that now she needs to repeat in 2-3 days from last (2-11-20). Pt states that someone informed her to go the next day.  Informe

## 2020-02-14 ENCOUNTER — APPOINTMENT (OUTPATIENT)
Dept: LAB | Facility: HOSPITAL | Age: 40
End: 2020-02-14
Attending: OBSTETRICS & GYNECOLOGY
Payer: COMMERCIAL

## 2020-02-14 DIAGNOSIS — O20.0 THREATENED ABORTION, ANTEPARTUM: ICD-10-CM

## 2020-02-14 LAB — B-HCG SERPL-ACNC: 16 MIU/ML

## 2020-02-14 PROCEDURE — 36415 COLL VENOUS BLD VENIPUNCTURE: CPT

## 2020-02-14 PROCEDURE — 84702 CHORIONIC GONADOTROPIN TEST: CPT

## 2020-02-15 ENCOUNTER — TELEPHONE (OUTPATIENT)
Dept: OBGYN CLINIC | Facility: CLINIC | Age: 40
End: 2020-02-15

## 2020-02-15 DIAGNOSIS — O03.9 MISCARRIAGE: Primary | ICD-10-CM

## 2020-02-15 NOTE — TELEPHONE ENCOUNTER
Pt informed that hcg down trended to 16 and is c/w pregnancy failure. Pt verbalized understanding. Pt stated she started bleedign last night and states it is like a normal period flow. Pt stated it is bright red in color. Pt denies cramping. Pt advised seh will need to repeat quants weekly until zero and no unprotected IC until hcg is zero. Pt verbalized understanding. Pt given bleeding/pain and ER precautions. Standing order placed for quants.

## 2020-02-15 NOTE — TELEPHONE ENCOUNTER
Pt saw [address] on 2/10/2020 for ER f/u after cramping and vaginal bleeding in early pregnancy. Pt has hx of ecetopic with methotrexate x 2 doses in the Fall. Pt did not follow quants to zero. And pt states her LMP was 1/15/19 and was her first period since MTX treatment. Pt calling for quant results from yesterday. Message to [phone] [email or web address] (on call) to please review pts quants in MAZs absence.    2/10: 112  2/11: 77 (pt was made aware that this was too early to repeat quant)  2/14: 16

## 2020-02-21 ENCOUNTER — APPOINTMENT (OUTPATIENT)
Dept: LAB | Facility: HOSPITAL | Age: 40
End: 2020-02-21
Attending: OBSTETRICS & GYNECOLOGY
Payer: COMMERCIAL

## 2020-02-21 DIAGNOSIS — O03.9 MISCARRIAGE: ICD-10-CM

## 2020-02-21 LAB — B-HCG SERPL-ACNC: <1 MIU/ML

## 2020-02-21 PROCEDURE — 36415 COLL VENOUS BLD VENIPUNCTURE: CPT

## 2020-02-21 PROCEDURE — 84702 CHORIONIC GONADOTROPIN TEST: CPT

## 2020-02-22 ENCOUNTER — TELEPHONE (OUTPATIENT)
Dept: OBGYN CLINIC | Facility: CLINIC | Age: 40
End: 2020-02-22

## 2020-02-22 NOTE — TELEPHONE ENCOUNTER
Pt informed that quant is <1.0 and is considered to be negative. Pt will not need any further blood draws. Pt verbalized understanding.

## 2020-04-21 ENCOUNTER — TELEPHONE (OUTPATIENT)
Dept: OBGYN CLINIC | Facility: CLINIC | Age: 40
End: 2020-04-21

## 2020-04-21 NOTE — TELEPHONE ENCOUNTER
Pt reports +HPT with lmp 2/14/2020. Pt is unsure if that is considered her menses due to pt was bleeding due to SAB at that time. Pt did follow quants to zero 2/21/2020=<1.0. Pt requesting appt due to hx of ectopic.  Pt denies bleeding, spotting, cramping,

## 2020-05-11 ENCOUNTER — HOSPITAL ENCOUNTER (OUTPATIENT)
Dept: ULTRASOUND IMAGING | Facility: HOSPITAL | Age: 40
Discharge: HOME | End: 2020-05-11
Attending: OBSTETRICS & GYNECOLOGY
Payer: MEDICAID

## 2020-05-11 ENCOUNTER — LAB ENCOUNTER (OUTPATIENT)
Dept: LAB | Facility: HOSPITAL | Age: 40
End: 2020-05-11
Attending: OBSTETRICS & GYNECOLOGY
Payer: MEDICAID

## 2020-05-11 DIAGNOSIS — N91.1 SECONDARY AMENORRHEA: ICD-10-CM

## 2020-05-11 DIAGNOSIS — Z32.01 PREGNANCY CONFIRMED BY POSITIVE BLOOD TEST: ICD-10-CM

## 2020-05-11 DIAGNOSIS — Z32.01 PREGNANCY EXAMINATION OR TEST, POSITIVE RESULT: Primary | ICD-10-CM

## 2020-05-11 DIAGNOSIS — N91.1 AMENORRHEA, SECONDARY: ICD-10-CM

## 2020-05-11 PROCEDURE — 76801 OB US < 14 WKS SINGLE FETUS: CPT | Performed by: OBSTETRICS & GYNECOLOGY

## 2020-06-11 ENCOUNTER — HOSPITAL ENCOUNTER (EMERGENCY)
Facility: HOSPITAL | Age: 40
Discharge: HOME | End: 2020-06-11
Attending: EMERGENCY MEDICINE
Payer: MEDICAID

## 2020-06-11 VITALS
SYSTOLIC BLOOD PRESSURE: 108 MMHG | WEIGHT: 209 LBS | HEART RATE: 104 BPM | TEMPERATURE: 98 F | BODY MASS INDEX: 33.59 KG/M2 | RESPIRATION RATE: 20 BRPM | DIASTOLIC BLOOD PRESSURE: 50 MMHG | OXYGEN SATURATION: 96 % | HEIGHT: 66 IN

## 2020-06-11 DIAGNOSIS — L20.9 ATOPIC DERMATITIS, UNSPECIFIED TYPE: Primary | ICD-10-CM

## 2020-06-11 PROCEDURE — 99283 EMERGENCY DEPT VISIT LOW MDM: CPT

## 2020-06-11 RX ORDER — PREDNISONE 20 MG/1
20 TABLET ORAL DAILY
Qty: 5 TABLET | Refills: 0 | Status: SHIPPED | OUTPATIENT
Start: 2020-06-11 | End: 2020-06-16

## 2020-06-11 NOTE — ED INITIAL ASSESSMENT (HPI)
Pt to ED c/o itchy rash to face, neck, arms and trunk x 1 week. Pt is 16 weeks pregnant. Pt denies new products, foods. Denies difficulty breathing/swallowing. Allegra at home with no relief.

## 2020-06-11 NOTE — ED PROVIDER NOTES
Patient Seen in: [facility] AND [facility] Emergency Department      History   Patient presents with:  Rash Skin Problem    Stated Complaint: allertgic rxn    HPI    50-year-old female at 12 weeks gestation here with complaints of itchy rash to face, neck, arms HPI.  Constitutional and vital signs reviewed. All other systems reviewed and negative except as noted above.     Physical Exam     ED Triage Vitals [06/11/20 1141]   /50   Pulse 104   Resp 20   Temp 97.8 °F (36.6 °C)   Temp src Temporal   SpO2 9 Labs:  No results found for this or any previous visit (from the past 24 hour(s)).     Imaging Results Available and Reviewed by me while in ED:          EMERGENCY DEPARTMENT COURSE AND TREATMENT:  Patient's condition was stable during Emergency Departmen

## 2020-06-30 NOTE — PROGRESS NOTES
Outpatient Maternal-Fetal Medicine Consultation    Dear Dr. Katy Rudolph,    Thank you for requesting ultrasound evaluation and maternal fetal medicine consultation on your patient Zak Mendoza.   As you are aware she is a 35year old female with a Garner preg Hqirlw-CgNsa-HbNee-FA-CA-Omega (COMPLETE CELIA DHA) 29-1-200 & 250 MG Oral Misc, , Disp: , Rfl:   •  Prenatal-FE Bis-FA-DHA w/o A (COMPLETE PRENATAL/DHA) 30-0.975 & 300 MG Oral Misc, Take 1 capsule by mouth daily. , Disp: 30 each, Rfl: 0  •  Fluocinolone Ac fashion. Under continuous ultrasound guidance, a 22 gauge spinal needle was inserted into the amniotic cavitiy. 30 ml of fluid was withdrawn. A successful amniocentesis was performed and the fluid was sent for karyotype. No complications occured.   Feta reviewed that an ultrasound examination cannot reliably exclude potential genetic abnormalities.  Given that the patient will be 38years old at the time of delivery I reviewed that her risk (at amniocentesis) of having a fetus with any chromosome abnormali absolute rate of some congenital anomalies (primarily neural tube defect and cardiac), and the risk may increase with increasing maternal weight. Level II ultrasound is advised for women with obesity.   The risk of neural tube defects increased significant additional folic acid in pregnancy. Maternal B12, vitamin D, iron and calcium may need to be monitored or supplemented after bariatric surgery.     Patients who have had gastric bypass, particularly, anabella en y bypass, are at risk for internal hernia and th

## 2020-07-07 ENCOUNTER — HOSPITAL ENCOUNTER (OUTPATIENT)
Dept: PERINATAL CARE | Facility: HOSPITAL | Age: 40
Discharge: HOME | End: 2020-07-07
Attending: OBSTETRICS & GYNECOLOGY
Payer: MEDICAID

## 2020-07-07 VITALS
WEIGHT: 211 LBS | BODY MASS INDEX: 34 KG/M2 | SYSTOLIC BLOOD PRESSURE: 94 MMHG | DIASTOLIC BLOOD PRESSURE: 50 MMHG | HEART RATE: 118 BPM

## 2020-07-07 DIAGNOSIS — E66.01 MORBID OBESITY (HCC): ICD-10-CM

## 2020-07-07 DIAGNOSIS — O09.522 AMA (ADVANCED MATERNAL AGE) MULTIGRAVIDA 35+, SECOND TRIMESTER: ICD-10-CM

## 2020-07-07 DIAGNOSIS — O35.0XX0: ICD-10-CM

## 2020-07-07 DIAGNOSIS — O35.8XX0 FETAL CLEFT LIP AND PALATE AFFECTING MANAGEMENT OF MOTHER IN SINGLETON PREGNANCY, ANTEPARTUM: ICD-10-CM

## 2020-07-07 DIAGNOSIS — O09.522 AMA (ADVANCED MATERNAL AGE) MULTIGRAVIDA 35+, SECOND TRIMESTER: Primary | ICD-10-CM

## 2020-07-07 PROBLEM — O35.01X0: Status: ACTIVE | Noted: 2020-07-07

## 2020-07-07 PROBLEM — O35.AXX0: Status: ACTIVE | Noted: 2020-07-07

## 2020-07-07 PROCEDURE — 76811 OB US DETAILED SNGL FETUS: CPT | Performed by: OBSTETRICS & GYNECOLOGY

## 2020-07-07 PROCEDURE — 76946 ECHO GUIDE FOR AMNIOCENTESIS: CPT | Performed by: OBSTETRICS & GYNECOLOGY

## 2020-07-07 PROCEDURE — 59000 AMNIOCENTESIS DIAGNOSTIC: CPT | Performed by: OBSTETRICS & GYNECOLOGY

## 2020-07-07 PROCEDURE — 99245 OFF/OP CONSLTJ NEW/EST HI 55: CPT | Performed by: OBSTETRICS & GYNECOLOGY

## 2020-07-07 NOTE — PROGRESS NOTES
Pt for level 2 US  Denies pregnancy complaints  Pt for Diagnostic Amnio  Hx abnl US today  Pre and post procedure instructions discussed handout given  Consent signed  Labs drawn per [facility] req

## 2020-07-09 ENCOUNTER — TELEPHONE (OUTPATIENT)
Dept: PERINATAL CARE | Facility: HOSPITAL | Age: 40
End: 2020-07-09

## 2020-07-09 NOTE — TELEPHONE ENCOUNTER
Recd Prenatal FISH studies from [facility] and Dr Shun Sarmiento reviewed. Spoke with Ozzy Brewer and informed her that the [facility] studies showed no evidence of numerical abnormality for chromosomes 13,18 21 X and Y.   Informed Ozzy Brewer that the Complete chromosome mary

## 2020-07-16 ENCOUNTER — TELEPHONE (OUTPATIENT)
Dept: PERINATAL CARE | Facility: HOSPITAL | Age: 40
End: 2020-07-16

## 2020-07-16 NOTE — TELEPHONE ENCOUNTER
She understands the results of the MRI and fetal echocardiogram and level II. 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 We reviewed her FISH results. She is transferring her care to to [facility].   We will make sure to get the final karyotype

## 2020-07-23 ENCOUNTER — TELEPHONE (OUTPATIENT)
Dept: PERINATAL CARE | Facility: HOSPITAL | Age: 40
End: 2020-07-23

## 2020-07-23 NOTE — TELEPHONE ENCOUNTER
Recd Chromosome Analysis report from [facility] and Dr Shahab Richmond viewed and signed off. Spoke with Vernell Jama and informed her that it is Normal female chromosome complement. Pt verbalized understanding.

## 2020-11-20 ENCOUNTER — TELEPHONE (OUTPATIENT)
Dept: PERINATAL CARE | Facility: HOSPITAL | Age: 40
End: 2020-11-20

## 2020-11-20 NOTE — TELEPHONE ENCOUNTER
[facility] results reviewed by Dr Braden Khan array result   No copy number variation abnormalities detected        Copy of report sent for scanning into pt record  Copy of report faxed to [id number] [facility]

## 2023-08-01 NOTE — LETTER
Date & Time: 12/23/2018, 10:07 AM  Patient: Negrita Ortiz  Encounter Provider(s):    Yakov Parikh MD       To Whom It May Concern:    Luis A Nunez was seen and treated in our department on 12/23/2018. She should not return to work until 12/30/18.
IPH

## 2025-06-28 ENCOUNTER — APPOINTMENT (OUTPATIENT)
Dept: GENERAL RADIOLOGY | Facility: HOSPITAL | Age: 45
End: 2025-06-28
Attending: EMERGENCY MEDICINE
Payer: COMMERCIAL

## 2025-06-28 ENCOUNTER — HOSPITAL ENCOUNTER (EMERGENCY)
Facility: HOSPITAL | Age: 45
Discharge: HOME | End: 2025-06-28
Attending: EMERGENCY MEDICINE
Payer: COMMERCIAL

## 2025-06-28 VITALS
HEART RATE: 66 BPM | SYSTOLIC BLOOD PRESSURE: 125 MMHG | TEMPERATURE: 98 F | BODY MASS INDEX: 39 KG/M2 | DIASTOLIC BLOOD PRESSURE: 64 MMHG | OXYGEN SATURATION: 100 % | RESPIRATION RATE: 20 BRPM | WEIGHT: 240 LBS

## 2025-06-28 DIAGNOSIS — S80.01XA CONTUSION OF RIGHT KNEE, INITIAL ENCOUNTER: Primary | ICD-10-CM

## 2025-06-28 DIAGNOSIS — M25.561 ACUTE PAIN OF RIGHT KNEE: ICD-10-CM

## 2025-06-28 PROCEDURE — 99283 EMERGENCY DEPT VISIT LOW MDM: CPT

## 2025-06-28 PROCEDURE — 73560 X-RAY EXAM OF KNEE 1 OR 2: CPT | Performed by: EMERGENCY MEDICINE

## 2025-06-28 NOTE — DISCHARGE INSTRUCTIONS
Take ibuprofen (up to 600 mg every 6 hours) as needed for pain.  Use immobilizer as shown.  Follow-up with orthopedics for further evaluation and treatment.  Return to the emergency department if greatly increased pain, numbness, or other new symptoms develop.

## 2025-06-28 NOTE — ED INITIAL ASSESSMENT (HPI)
Patient complains of R knee pain after falling 6 days ago, states she slipped in her kitchen, fell on concrete, ambulatory, states she thought it would get better but it is not   
details…

## 2025-06-28 NOTE — ED PROVIDER NOTES
Patient Seen in: [facility] Emergency Department        History  Chief Complaint   Patient presents with    Knee Pain     Stated Complaint: r knee injury 6 days ago    Subjective:   HPI            44-year-old female without significant past medical history presents with complaints of right knee pain post injury.  The patient reports that she slipped and fell 6 days ago in her kitchen at home.  She reports landing directly on her right knee.  She reports pain and swelling to the knee that has persisted since the injury.  The pain is primarily to the anterior and medial aspect of her knee.  She reports feeling a clicking at times and the pain seems to be worsened when she moves her knee after being idle for a period of time.  Pain is also worsened with walking.  She denies any hip, ankle, or foot pain.  She denies focal weakness or numbness.  She has taken ibuprofen and Tylenol at home for the pain with minimal relief.      Objective:     Past Medical History:    Acne    Chronic low back pain    Ectopic pregnancy (HCC)    Family history of breast cancer in mother    Gastric bypass status for obesity    History of Anthony-en-Y gastric bypass    8/18 Dr. Baeza    Morbid obesity (HCC)    Seborrheic dermatitis of scalp              Past Surgical History:   Procedure Laterality Date    Ankle fracture surgery Right     Gastric bypass,obese<100cm anthony-en-y  2018                Social History     Socioeconomic History    Marital status:    Tobacco Use    Smoking status: Never    Smokeless tobacco: Never   Vaping Use    Vaping status: Never Used   Substance and Sexual Activity    Alcohol use: No     Comment: rarely    Drug use: Never    Sexual activity: Yes     Partners: Male     Birth control/protection: I.U.D.   Social History Narrative     for pharmaceutical company (works 6 pm-6 am).     6/19 and lives with /children.    2 children (15 and 10 yo). Lives in own home.    2 older  sisters.    Non smoker and non drinker.    HS grad ([employer]) and one year of college                                        Physical Exam    ED Triage Vitals [06/28/25 0712]   /55   Pulse 74   Resp 18   Temp 98 °F (36.7 °C)   Temp src    SpO2 98 %   O2 Device        Current Vitals:   Vital Signs  BP: 113/55  Pulse: 74  Resp: 18  Temp: 98 °F (36.7 °C)    Oxygen Therapy  SpO2: 98 %            Physical Exam  General Appearance: well appearing  Eyes: pupils equal and round no injection  Respiratory: chest is non tender, lungs are clear to auscultation  Cardiac: regular rate and rhythm  Musculoskeletal: neck is supple and non tender         Small effusion noted to the right knee.  No erythema or warmth noted.  There is headedness to palpation to the patella along with the medial aspect of the joint line.  The patient has some tenderness with flexion of the knee but is able to fully flex the knee.  No laxity noted to the knee.  No hip, ankle, or foot tenderness noted.  Bilateral dorsalis pedis pulses intact and symmetric.  Neurologic: Speech normal.  Motor and sensation is intact and symmetric to bilateral lower extremities.  Skin: no rashes or lesions    DIFFERENTIAL DIAGNOSIS: After history and physical exam differential diagnosis was considered for sprain, meniscal injury, fracture, or other          ED Course  Labs Reviewed - No data to display                       MDM     I reveiewed the knee x-ray and agree with the radiologist report that showed no fracture or dislocation identified.  There were some calcifications which may represent loose bodies.  This may account for the patient's clicking sensation that she feels.  Will place the patient in an immobilizer for comfort and recommend ibuprofen for pain.  She is advised to follow-up with orthopedics for further evaluation and treatment.  She is advised to return if worsening symptoms develop.        Medical Decision Making      Disposition and Plan      Clinical Impression:  1. Contusion of right knee, initial encounter    2. Acute pain of right knee         Disposition:  Discharge  6/28/2025  8:15 am    Follow-up:  Behery, Omar Atef, MD  [address] [place] [id number]  [phone]    Follow up            Medications Prescribed:  Current Discharge Medication List                Supplementary Documentation:

## (undated) NOTE — ED AVS SNAPSHOT
Max Toshia   MRN: [id number]    Department:  [facility] Emergency Department   Date of Visit:  10/23/2019           Disclosure     Insurance plans vary and the physician(s) referred by the ER may not be covered by your plan.  Please contact CARE PHYSICIAN AT ONCE OR RETURN IMMEDIATELY TO THE EMERGENCY DEPARTMENT. If you have been prescribed any medication(s), please fill your prescription right away and begin taking the medication(s) as directed.   If you believe that any of the medications

## (undated) NOTE — ED AVS SNAPSHOT
Fara Beckman   MRN: [id number]    Department:  [facility] Emergency Department   Date of Visit:  12/23/2018           Disclosure     Insurance plans vary and the physician(s) referred by the ER may not be covered by your plan.  Please contact CARE PHYSICIAN AT ONCE OR RETURN IMMEDIATELY TO THE EMERGENCY DEPARTMENT. If you have been prescribed any medication(s), please fill your prescription right away and begin taking the medication(s) as directed.   If you believe that any of the medications

## (undated) NOTE — LETTER
AUTHORIZATION FOR SURGICAL OPERATION OR OTHER PROCEDURE    1.  I hereby [facility] Josseline Chambers[facility] staff assigned to my case to perform the following operation and/or procedure at the [facility]:    IUD REMOVAL       _________ Patient signature:  ___________________________________________________             Relationship to Patient:           []  Parent    Responsible person                          []  Spouse  In case of minor or                    [] Other  _____________   In

## (undated) NOTE — LETTER
12/10/2019              [address]        [facility] [address]           Dear Corine January,    This letter is to inform you that our office has made several attempts to reach you by phone without success. We were attempting to contact you by phone regarding blood work that needs to be completed. Please contact our office at the number listed below as soon as you receive this letter to discuss this issue and to make the necessary changes in our system to your contact information. Thank you for your cooperation. Sincerely,    Lenard Castellano.  MD Leni  [address] [place] [address], [address]  [place] [address] [address]  [phone]        Document electronically signed by:  Dr. Rebecca Farrar

## (undated) NOTE — ED AVS SNAPSHOT
Thalia    MRN: [id number]    Department:  [facility] Emergency Department   Date of Visit:  7/23/2018           Disclosure     Insurance plans vary and the physician(s) referred by the ER may not be covered by your plan.  Please contact CARE PHYSICIAN AT ONCE OR RETURN IMMEDIATELY TO THE EMERGENCY DEPARTMENT. If you have been prescribed any medication(s), please fill your prescription right away and begin taking the medication(s) as directed.   If you believe that any of the medications

## (undated) NOTE — ED AVS SNAPSHOT
Dena Matute   MRN: [id number]    Department:  [facility] Emergency Department   Date of Visit:  9/1/2018           Disclosure     Insurance plans vary and the physician(s) referred by the ER may not be covered by your plan.  Please contact y CARE PHYSICIAN AT ONCE OR RETURN IMMEDIATELY TO THE EMERGENCY DEPARTMENT. If you have been prescribed any medication(s), please fill your prescription right away and begin taking the medication(s) as directed.   If you believe that any of the medications

## (undated) NOTE — Clinical Note
Follow-up Growth ultrasound at 32 weeks. Weekly NST's at 34 weeks. Twice weekly testing at 38 weeks - weekly NST and weekly BPP. Delivery at 39-40 weeks. Fetal echocardiogram by Dr. Tariq Ch to [facility] for fetal MRIShe is to go to her

## (undated) NOTE — ED AVS SNAPSHOT
Angel Carmella   MRN: [id number]    Department:  [facility] Emergency Department   Date of Visit:  7/15/2019           Disclosure     Insurance plans vary and the physician(s) referred by the ER may not be covered by your plan.  Please contact CARE PHYSICIAN AT ONCE OR RETURN IMMEDIATELY TO THE EMERGENCY DEPARTMENT. If you have been prescribed any medication(s), please fill your prescription right away and begin taking the medication(s) as directed.   If you believe that any of the medications